# Patient Record
Sex: FEMALE | Race: WHITE | NOT HISPANIC OR LATINO | URBAN - NONMETROPOLITAN AREA
[De-identification: names, ages, dates, MRNs, and addresses within clinical notes are randomized per-mention and may not be internally consistent; named-entity substitution may affect disease eponyms.]

---

## 2019-01-24 ENCOUNTER — IMPORTED ENCOUNTER (OUTPATIENT)
Dept: URBAN - NONMETROPOLITAN AREA CLINIC 1 | Facility: CLINIC | Age: 54
End: 2019-01-24

## 2019-01-24 PROBLEM — Z79.899: Noted: 2019-01-24

## 2019-01-24 PROBLEM — M35.01: Noted: 2019-01-24

## 2019-01-24 PROBLEM — H53.2: Noted: 2019-01-24

## 2019-01-24 PROBLEM — M32.8: Noted: 2019-01-24

## 2019-01-24 PROBLEM — H52.223: Noted: 2019-01-24

## 2019-01-24 PROCEDURE — 92015 DETERMINE REFRACTIVE STATE: CPT

## 2019-01-24 PROCEDURE — 92014 COMPRE OPH EXAM EST PT 1/>: CPT

## 2019-01-24 PROCEDURE — 92083 EXTENDED VISUAL FIELD XM: CPT

## 2019-01-24 PROCEDURE — 92134 CPTRZ OPH DX IMG PST SGM RTA: CPT

## 2019-01-24 NOTE — PATIENT DISCUSSION
[address] OU- Lupus - Patient is on Plaquenil 200mg PO QD- VF done today: full no defects OU- No toxicity noted on today's exam - OCT done today: no maculopathy noted - Continue medication as directed by PCP Astigmatism OU- New glasses Rx given today per patient request Diplopia - Patient c/o blurred vision and diplopia today. No ocular manifestations noted on today's exam. She states sometimes these episodes will last a full day and other times she doesn't have any symptoms at all.  - She is unsure if the diplopia is binocular or monocular - Pt very well versed with regard to symptoms to report and possible toxic effects.- VF is excellent OU and mac OCT shows no change or loss in outer nuclear layer. - Recommend patient see Dr. Tuan Colmenares in [place] for further evaluation patient agrees with plan

## 2019-08-06 ENCOUNTER — IMPORTED ENCOUNTER (OUTPATIENT)
Dept: URBAN - NONMETROPOLITAN AREA CLINIC 1 | Facility: CLINIC | Age: 54
End: 2019-08-06

## 2019-08-06 PROBLEM — H52.223: Noted: 2019-08-06

## 2019-08-06 PROBLEM — Z79.899: Noted: 2019-01-24

## 2019-08-06 PROBLEM — H53.2: Noted: 2019-08-06

## 2019-08-06 PROBLEM — M32.8: Noted: 2019-01-24

## 2019-08-06 PROBLEM — M35.01: Noted: 2019-08-06

## 2019-08-06 PROCEDURE — 99214 OFFICE O/P EST MOD 30 MIN: CPT

## 2019-08-06 PROCEDURE — 92134 CPTRZ OPH DX IMG PST SGM RTA: CPT

## 2019-08-12 ENCOUNTER — IMPORTED ENCOUNTER (OUTPATIENT)
Dept: URBAN - NONMETROPOLITAN AREA CLINIC 1 | Facility: CLINIC | Age: 54
End: 2019-08-12

## 2019-08-12 PROCEDURE — 92083 EXTENDED VISUAL FIELD XM: CPT

## 2019-08-12 NOTE — PATIENT DISCUSSION
[address] OU- Lupus - Patient is on Plaquenil 200mg PO BIDD- No toxicity noted on today's exam - Continue medication as directed by PCP- Recommend  today and shows full fields OU with few inf/temp misses within elipsoid zone OD. Resolved Diplopia - Recommend observation; 's Notes: 8/6/19 pt on 400mg daily

## 2020-02-13 ENCOUNTER — IMPORTED ENCOUNTER (OUTPATIENT)
Dept: URBAN - NONMETROPOLITAN AREA CLINIC 1 | Facility: CLINIC | Age: 55
End: 2020-02-13

## 2020-02-13 PROCEDURE — 99214 OFFICE O/P EST MOD 30 MIN: CPT

## 2020-02-13 PROCEDURE — 92134 CPTRZ OPH DX IMG PST SGM RTA: CPT

## 2020-02-13 NOTE — PATIENT DISCUSSION
HRM c Plaquenil OU- Lupus - Patient is on Plaquenil 200mg PO BID- No toxicity noted on today's exam - Excellent renal function and tested every 3 months. - Fundus shows strong foveal light reflex OU and no pigment changes.- OCT data is excellent quality and shows no changes in ONL or IS/OS at the ellipsoid zone regions.- Next visit run 10-2 VF and FAF- Continue medication as directed by PCP Maikel Dykes and Shanti)Resolved Diplopia - Recommend observation.; 's Notes: 8/6/19 pt on 400mg daily

## 2020-08-05 ENCOUNTER — IMPORTED ENCOUNTER (OUTPATIENT)
Dept: URBAN - NONMETROPOLITAN AREA CLINIC 1 | Facility: CLINIC | Age: 55
End: 2020-08-05

## 2020-08-05 PROCEDURE — 92015 DETERMINE REFRACTIVE STATE: CPT

## 2020-08-05 NOTE — PATIENT DISCUSSION
HRM c Plaquenil OU- Lupus - Patient is on Plaquenil 200mg PO BID- No toxicity noted on today's exam - Excellent renal function and tested every 3 months. - Fundus shows strong foveal light reflex OU and no pigment changes.- OCT data is excellent quality and shows no changes in ONL or IS/OS at the ellipsoid zone regions.- Next visit run 10-2 VF and FAF- Continue medication as directed by PCP Mariah Van and Shanti)Resolved Diplopia - Recommend observation.; Dr's Notes: 8/6/19 pt on 400mg daily

## 2020-08-13 ENCOUNTER — IMPORTED ENCOUNTER (OUTPATIENT)
Dept: URBAN - NONMETROPOLITAN AREA CLINIC 1 | Facility: CLINIC | Age: 55
End: 2020-08-13

## 2020-08-13 PROCEDURE — 92083 EXTENDED VISUAL FIELD XM: CPT

## 2020-08-13 PROCEDURE — 99214 OFFICE O/P EST MOD 30 MIN: CPT

## 2020-08-13 NOTE — PATIENT DISCUSSION
HRM c Plaquenil OU- Lupus - Patient is on Plaquenil 200mg PO BID- No toxicity noted on today's exam - Excellent renal function and tested every 3 months. - Fundus shows strong foveal light reflex OU and no pigment changes.- OCT data is excellent quality and shows no changes in ONL or IS/OS at the ellipsoid zone regions.- Next visit run Macular OCT- Continue medication as directed by PCP Gabriella Whitehead and Shanti)Resolved Diplopia - Recommend observation.; 's Notes: 8/6/19 pt on 400mg daily

## 2021-02-16 ENCOUNTER — IMPORTED ENCOUNTER (OUTPATIENT)
Dept: URBAN - NONMETROPOLITAN AREA CLINIC 1 | Facility: CLINIC | Age: 56
End: 2021-02-16

## 2021-02-16 PROCEDURE — 92134 CPTRZ OPH DX IMG PST SGM RTA: CPT

## 2021-02-16 PROCEDURE — 99214 OFFICE O/P EST MOD 30 MIN: CPT

## 2021-02-16 NOTE — PATIENT DISCUSSION
HRM c Plaquenil OU- Lupus - Patient is on Plaquenil 200mg PO BID- No toxicity noted on today's exam - Excellent renal function and tested every 3 months. - Fundus shows strong foveal light reflex OU and no pigment changes.- OCT data today is excellent quality and shows no changes in ONL or IS/OS at the ellipsoid zone regions. - Recommend further evaluation with [facility] for Plaquenil use with OCT Mac and FAF photos-Recommend medication as directed by PCP Cierra Asencio and Shanti)Resolved Diplopia - Recommend observation.; 's Notes: 8/6/19 pt on 400mg daily

## 2022-02-15 ENCOUNTER — ESTABLISHED PATIENT (OUTPATIENT)
Dept: RURAL CLINIC 3 | Facility: CLINIC | Age: 57
End: 2022-02-15

## 2022-02-15 DIAGNOSIS — H52.4: ICD-10-CM

## 2022-02-15 DIAGNOSIS — Z79.899: ICD-10-CM

## 2022-02-15 PROCEDURE — 92134 CPTRZ OPH DX IMG PST SGM RTA: CPT

## 2022-02-15 PROCEDURE — 99214 OFFICE O/P EST MOD 30 MIN: CPT

## 2022-02-15 PROCEDURE — 92015 DETERMINE REFRACTIVE STATE: CPT

## 2022-02-15 ASSESSMENT — TONOMETRY
OS_IOP_MMHG: 18
OD_IOP_MMHG: 18

## 2022-02-15 ASSESSMENT — VISUAL ACUITY
OD_CC: 20/30+1
OS_CC: 20/20

## 2022-02-15 NOTE — PATIENT DISCUSSION
(without) Plaquenil without evidence of Toxicity or Plaquenil retinopathy on High Risk Medication. Kia Pedroza for patient to remain on Plaquenil.

## 2022-04-15 ASSESSMENT — TONOMETRY
OD_IOP_MMHG: 22
OS_IOP_MMHG: 22
OD_IOP_MMHG: 19
OD_IOP_MMHG: 21
OS_IOP_MMHG: 21
OS_IOP_MMHG: 0
OD_IOP_MMHG: 23
OS_IOP_MMHG: 20
OD_IOP_MMHG: 18
OS_IOP_MMHG: 22

## 2022-04-15 ASSESSMENT — VISUAL ACUITY
OD_SC: J2
OS_SC: 20/20-1
OD_SC: 20/25
OS_SC: 20/25+2
OS_SC: J2
OD_SC: 20/25
OD_CC: 20/70+
OD_SC: 20/20
OS_CC: 20/40
OS_SC: 20/20 -2
OD_CC: 20/50+1
OD_PH: 20/30-
OS_PH: 20/25+
OS_CC: 20/40-2

## 2022-08-04 ENCOUNTER — FOLLOW UP (OUTPATIENT)
Dept: RURAL CLINIC 3 | Facility: CLINIC | Age: 57
End: 2022-08-04

## 2022-08-04 DIAGNOSIS — Z79.899: ICD-10-CM

## 2022-08-04 DIAGNOSIS — M35.01: ICD-10-CM

## 2022-08-04 DIAGNOSIS — H25.813: ICD-10-CM

## 2022-08-04 PROCEDURE — 92083 EXTENDED VISUAL FIELD XM: CPT

## 2022-08-04 PROCEDURE — 99214 OFFICE O/P EST MOD 30 MIN: CPT

## 2022-08-04 ASSESSMENT — VISUAL ACUITY
OS_CC: 20/20-1
OD_CC: 20/25

## 2022-08-04 ASSESSMENT — TONOMETRY
OS_IOP_MMHG: 18
OD_IOP_MMHG: 18

## 2022-08-04 NOTE — PATIENT DISCUSSION
(without) Plaquenil without evidence of Toxicity or Plaquenil retinopathy on High Risk Medication. [id number] Susan Tejeda for patient to remain on Plaquenil.

## 2022-08-17 NOTE — PATIENT DISCUSSION
VISUALLY SIGNIFICANT. OPTION OF SURGERY VERSUS FOLLOWING VERSUS UPDATING  GLASSES DISCUSSED. RBA'S DISCUSSED, PATIENT UNDERSTANDS AND DESIRES SURGERY TO INCREASE  VISION FOR READING, DRIVING AND GLARE FROM LIGHTS. SCHEDULE CATARACT SURGERY/PRE-OP OU.

## 2022-09-19 NOTE — PATIENT DISCUSSION
DOING WELL. CONTINUE DROPS AS DIRECTED. SPECS RX OFFERED. RX ARC IN SPECS  TO MINIMIZE GLARE. RETURN FOR FOLLOW-UP AS SCHEDULED.

## 2022-09-19 NOTE — PATIENT DISCUSSION
The patient has noticed an improvement in their visual symptoms in the  operative eye. The patient complains of decreased vision in the fellow eye [facility] It was explained to the patient that the decision to proceed with cataract  surgery in the fellow eye is entirely a separate decision from the surgical eye. All of the same risks,  benefits and alternatives ere reviewed with the patient again. The patient does feel the vision in the  non-operative eye is limiting their daily activities and elects to proceed with cataract surgery in the  LEFT eye. Schedule cataract surgery/ pre op OS.

## 2022-09-19 NOTE — PATIENT DISCUSSION
Pio Pisano RBA'S DISCUSSED, PATIENT UNDERSTANDS AND DESIRES TO PROCEED WITH  SURGERY. CONSENT READ AND SIGNED. PATIENT DESIRES STANDARD FOR DISTANCE.

## 2023-11-15 ENCOUNTER — FOLLOW UP (OUTPATIENT)
Dept: RURAL CLINIC 3 | Facility: CLINIC | Age: 58
End: 2023-11-15

## 2023-11-15 DIAGNOSIS — H52.4: ICD-10-CM

## 2023-11-15 DIAGNOSIS — Z79.899: ICD-10-CM

## 2023-11-15 PROCEDURE — 92015 DETERMINE REFRACTIVE STATE: CPT

## 2023-11-15 PROCEDURE — 92134 CPTRZ OPH DX IMG PST SGM RTA: CPT

## 2023-11-15 PROCEDURE — 99214 OFFICE O/P EST MOD 30 MIN: CPT

## 2023-11-15 ASSESSMENT — VISUAL ACUITY
OS_CC: 20/20
OD_CC: 20/25-2

## 2023-11-15 ASSESSMENT — TONOMETRY
OS_IOP_MMHG: 19
OD_IOP_MMHG: 19

## 2024-06-28 NOTE — PATIENT DISCUSSION
[address] OU- Lupus - Patient is on Plaquenil 200mg PO BIDD- No toxicity noted on today's exam - Continue medication as directed by PCP- Recommnend OCT today and shows normal foveal contour normal ONL thickness and IS/OS continuouson all line scans OU- Recommend follow-up in 6 months with OCT-mac. Resolved Diplopia - Recommend observation; 's Notes: 8/6/19 pt on 400mg daily Detail Level: Generalized Detail Level: Zone